# Patient Record
Sex: FEMALE | Race: WHITE
[De-identification: names, ages, dates, MRNs, and addresses within clinical notes are randomized per-mention and may not be internally consistent; named-entity substitution may affect disease eponyms.]

---

## 2018-12-19 ENCOUNTER — HOSPITAL ENCOUNTER (OUTPATIENT)
Dept: HOSPITAL 95 - LAB SHORT | Age: 63
Discharge: HOME | End: 2018-12-19
Attending: PHYSICIAN ASSISTANT
Payer: SELF-PAY

## 2018-12-19 DIAGNOSIS — R53.83: Primary | ICD-10-CM

## 2018-12-19 LAB
ANION GAP SERPL CALCULATED.4IONS-SCNC: 7 MMOL/L (ref 6–16)
BASOPHILS # BLD AUTO: 0.04 K/MM3 (ref 0–0.23)
BASOPHILS NFR BLD AUTO: 1 % (ref 0–2)
BUN SERPL-MCNC: 9 MG/DL (ref 8–24)
CALCIUM SERPL-MCNC: 8.6 MG/DL (ref 8.5–10.1)
CHLORIDE SERPL-SCNC: 101 MMOL/L (ref 98–108)
CO2 SERPL-SCNC: 31 MMOL/L (ref 21–32)
CREAT SERPL-MCNC: 0.87 MG/DL (ref 0.4–1)
DEPRECATED RDW RBC AUTO: 47 FL (ref 35.1–46.3)
EOSINOPHIL # BLD AUTO: 0.21 K/MM3 (ref 0–0.68)
EOSINOPHIL NFR BLD AUTO: 3 % (ref 0–6)
ERYTHROCYTE [DISTWIDTH] IN BLOOD BY AUTOMATED COUNT: 13.5 % (ref 11.7–14.2)
GLUCOSE SERPL-MCNC: 145 MG/DL (ref 70–99)
HCT VFR BLD AUTO: 41.2 % (ref 33–51)
HGB BLD-MCNC: 13.9 G/DL (ref 11.5–16)
IMM GRANULOCYTES # BLD AUTO: 0.02 K/MM3 (ref 0–0.1)
IMM GRANULOCYTES NFR BLD AUTO: 0 % (ref 0–1)
LYMPHOCYTES # BLD AUTO: 2.71 K/MM3 (ref 0.84–5.2)
LYMPHOCYTES NFR BLD AUTO: 43 % (ref 21–46)
MCHC RBC AUTO-ENTMCNC: 33.7 G/DL (ref 31.5–36.5)
MCV RBC AUTO: 94 FL (ref 80–100)
MONOCYTES # BLD AUTO: 0.51 K/MM3 (ref 0.16–1.47)
MONOCYTES NFR BLD AUTO: 8 % (ref 4–13)
NEUTROPHILS # BLD AUTO: 2.83 K/MM3 (ref 1.96–9.15)
NEUTROPHILS NFR BLD AUTO: 45 % (ref 41–73)
NRBC # BLD AUTO: 0 K/MM3 (ref 0–0.02)
NRBC BLD AUTO-RTO: 0 /100 WBC (ref 0–0.2)
PLATELET # BLD AUTO: 61 K/MM3 (ref 150–400)
POTASSIUM SERPL-SCNC: 3.3 MMOL/L (ref 3.5–5.5)
SODIUM SERPL-SCNC: 139 MMOL/L (ref 136–145)
TROPONIN I SERPL-MCNC: <0.015 NG/ML (ref 0–0.04)

## 2019-01-25 ENCOUNTER — HOSPITAL ENCOUNTER (INPATIENT)
Dept: HOSPITAL 95 - ER | Age: 64
LOS: 3 days | Discharge: ANOTHER HOSPITAL | DRG: 871 | End: 2019-01-28
Attending: HOSPITALIST | Admitting: HOSPITALIST
Payer: SELF-PAY

## 2019-01-25 VITALS — HEIGHT: 69.02 IN | BODY MASS INDEX: 41.14 KG/M2 | WEIGHT: 277.78 LBS

## 2019-01-25 DIAGNOSIS — R63.4: ICD-10-CM

## 2019-01-25 DIAGNOSIS — D69.6: ICD-10-CM

## 2019-01-25 DIAGNOSIS — L03.115: ICD-10-CM

## 2019-01-25 DIAGNOSIS — Z85.828: ICD-10-CM

## 2019-01-25 DIAGNOSIS — K74.60: ICD-10-CM

## 2019-01-25 DIAGNOSIS — I33.0: ICD-10-CM

## 2019-01-25 DIAGNOSIS — Z88.0: ICD-10-CM

## 2019-01-25 DIAGNOSIS — Z88.6: ICD-10-CM

## 2019-01-25 DIAGNOSIS — E87.2: ICD-10-CM

## 2019-01-25 DIAGNOSIS — A41.1: Primary | ICD-10-CM

## 2019-01-25 DIAGNOSIS — K25.4: ICD-10-CM

## 2019-01-25 DIAGNOSIS — R01.1: ICD-10-CM

## 2019-01-25 DIAGNOSIS — D50.0: ICD-10-CM

## 2019-01-25 DIAGNOSIS — B18.2: ICD-10-CM

## 2019-01-25 DIAGNOSIS — K21.9: ICD-10-CM

## 2019-01-25 DIAGNOSIS — B95.8: ICD-10-CM

## 2019-01-25 DIAGNOSIS — R06.00: ICD-10-CM

## 2019-01-25 DIAGNOSIS — R55: ICD-10-CM

## 2019-01-25 DIAGNOSIS — I87.321: ICD-10-CM

## 2019-01-25 DIAGNOSIS — I35.1: ICD-10-CM

## 2019-01-25 DIAGNOSIS — Z88.2: ICD-10-CM

## 2019-01-25 DIAGNOSIS — E66.9: ICD-10-CM

## 2019-01-25 DIAGNOSIS — F17.210: ICD-10-CM

## 2019-01-25 DIAGNOSIS — D72.829: ICD-10-CM

## 2019-01-25 LAB
ALBUMIN SERPL BCP-MCNC: 1.9 G/DL (ref 3.4–5)
ALBUMIN/GLOB SERPL: 0.6 {RATIO} (ref 0.8–1.8)
ALT SERPL W P-5'-P-CCNC: 20 U/L (ref 12–78)
ANION GAP SERPL CALCULATED.4IONS-SCNC: 7 MMOL/L (ref 6–16)
AST SERPL W P-5'-P-CCNC: 39 U/L (ref 12–37)
BASOPHILS # BLD AUTO: 0.05 K/MM3 (ref 0–0.23)
BASOPHILS NFR BLD AUTO: 0 % (ref 0–2)
BILIRUB SERPL-MCNC: 1.6 MG/DL (ref 0.1–1)
BUN SERPL-MCNC: 9 MG/DL (ref 8–24)
CALCIUM SERPL-MCNC: 7.2 MG/DL (ref 8.5–10.1)
CHLORIDE SERPL-SCNC: 107 MMOL/L (ref 98–108)
CO2 SERPL-SCNC: 29 MMOL/L (ref 21–32)
CREAT SERPL-MCNC: 0.68 MG/DL (ref 0.4–1)
DEPRECATED RDW RBC AUTO: 53.4 FL (ref 35.1–46.3)
EOSINOPHIL # BLD AUTO: 0.16 K/MM3 (ref 0–0.68)
EOSINOPHIL NFR BLD AUTO: 1 % (ref 0–6)
ERYTHROCYTE [DISTWIDTH] IN BLOOD BY AUTOMATED COUNT: 14.3 % (ref 11.7–14.2)
GLOBULIN SER CALC-MCNC: 3.3 G/DL (ref 2.2–4)
GLUCOSE SERPL-MCNC: 162 MG/DL (ref 70–99)
HCT VFR BLD AUTO: 31.4 % (ref 33–51)
HGB BLD-MCNC: 9.8 G/DL (ref 11.5–16)
IMM GRANULOCYTES # BLD AUTO: 0.18 K/MM3 (ref 0–0.1)
IMM GRANULOCYTES NFR BLD AUTO: 2 % (ref 0–1)
LYMPHOCYTES # BLD AUTO: 2.66 K/MM3 (ref 0.84–5.2)
LYMPHOCYTES NFR BLD AUTO: 22 % (ref 21–46)
MCHC RBC AUTO-ENTMCNC: 31.2 G/DL (ref 31.5–36.5)
MCV RBC AUTO: 102 FL (ref 80–100)
MONOCYTES # BLD AUTO: 0.95 K/MM3 (ref 0.16–1.47)
MONOCYTES NFR BLD AUTO: 8 % (ref 4–13)
NEUTROPHILS # BLD AUTO: 8.09 K/MM3 (ref 1.96–9.15)
NEUTROPHILS NFR BLD AUTO: 67 % (ref 41–73)
NRBC # BLD AUTO: 0 K/MM3 (ref 0–0.02)
NRBC BLD AUTO-RTO: 0 /100 WBC (ref 0–0.2)
PLATELET # BLD AUTO: 81 K/MM3 (ref 150–400)
POTASSIUM SERPL-SCNC: 4.6 MMOL/L (ref 3.5–5.5)
PROT SERPL-MCNC: 5.2 G/DL (ref 6.4–8.2)
PROTHROMBIN TIME: 14.6 SEC (ref 9.7–11.5)
SODIUM SERPL-SCNC: 143 MMOL/L (ref 136–145)

## 2019-01-25 PROCEDURE — C9113 INJ PANTOPRAZOLE SODIUM, VIA: HCPCS

## 2019-01-25 PROCEDURE — C1751 CATH, INF, PER/CENT/MIDLINE: HCPCS

## 2019-01-25 PROCEDURE — P9016 RBC LEUKOCYTES REDUCED: HCPCS

## 2019-01-26 LAB
BASOPHILS # BLD AUTO: 0.03 K/MM3 (ref 0–0.23)
BASOPHILS NFR BLD AUTO: 0 % (ref 0–2)
BILIRUB UR QL STRIP: (no result)
DEPRECATED RDW RBC AUTO: 53.4 FL (ref 35.1–46.3)
EOSINOPHIL # BLD AUTO: 0.03 K/MM3 (ref 0–0.68)
EOSINOPHIL NFR BLD AUTO: 0 % (ref 0–6)
ERYTHROCYTE [DISTWIDTH] IN BLOOD BY AUTOMATED COUNT: 14.4 % (ref 11.7–14.2)
HCT VFR BLD AUTO: 24.2 % (ref 33–51)
HCT VFR BLD AUTO: 26.6 % (ref 33–51)
HCT VFR BLD AUTO: 27.5 % (ref 33–51)
HCT VFR BLD AUTO: 29.8 % (ref 33–51)
HGB BLD-MCNC: 7.6 G/DL (ref 11.5–16)
HGB BLD-MCNC: 8.6 G/DL (ref 11.5–16)
HGB BLD-MCNC: 8.7 G/DL (ref 11.5–16)
HGB BLD-MCNC: 9.2 G/DL (ref 11.5–16)
IMM GRANULOCYTES # BLD AUTO: 0.14 K/MM3 (ref 0–0.1)
IMM GRANULOCYTES NFR BLD AUTO: 1 % (ref 0–1)
KETONES UR STRIP-MCNC: (no result) MG/DL
LEUKOCYTE ESTERASE UR QL STRIP: (no result)
LYMPHOCYTES # BLD AUTO: 1.64 K/MM3 (ref 0.84–5.2)
LYMPHOCYTES NFR BLD AUTO: 17 % (ref 21–46)
MCHC RBC AUTO-ENTMCNC: 31.3 G/DL (ref 31.5–36.5)
MCV RBC AUTO: 102 FL (ref 80–100)
MONOCYTES # BLD AUTO: 0.65 K/MM3 (ref 0.16–1.47)
MONOCYTES NFR BLD AUTO: 7 % (ref 4–13)
NEUTROPHILS # BLD AUTO: 7.22 K/MM3 (ref 1.96–9.15)
NEUTROPHILS NFR BLD AUTO: 74 % (ref 41–73)
NRBC # BLD AUTO: 0 K/MM3 (ref 0–0.02)
NRBC BLD AUTO-RTO: 0 /100 WBC (ref 0–0.2)
PLATELET # BLD AUTO: 57 K/MM3 (ref 150–400)
PROT UR STRIP-MCNC: (no result) MG/DL
RBC #/AREA URNS HPF: (no result) /HPF (ref 0–2)
SP GR SPEC: 1.02 (ref 1–1.02)
UROBILINOGEN UR STRIP-MCNC: (no result) MG/DL
WBC #/AREA URNS HPF: (no result) /HPF (ref 0–5)

## 2019-01-26 NOTE — NUR
CALLED DR MATHUR. TEMP 103.2. ICED. NOW DOWN .7. /77 P 106.
ORDERS OKAY PO TYLENOL. AND GIVE 500 BOLUS N/C

## 2019-01-26 NOTE — NUR
walking rounds with day shift nurse, call light in reach, a+o, able to make
needs known, room air, saline locked, able to transfer to bathroom with
standby assist, two mostly bloody stools, states she is feeling weak, checked
vitials

## 2019-01-26 NOTE — NUR
ASSUMED CARE OF PT-
BEDSIDE REPORT COMPLETE WITH VIRGIE MENDOZA. PER RPOET PT IS 1 ASSIST TO THE
BATHROOM. PT HAS A GI BLEED. GI CONSULT CALLED PER REPORT FROM NIGHT RN. PT
ALERT AND ORIENTED. SPOKE TO PT AFTER REPORT AND SHE REPORTS FREQUENT FALLS AT
HOME STATED HER LEFT ELBOW HAS BEEN PAINFUL SINCE SHE HAD A FALL AT HOME. PT
HAS TWO AC IVS BOTH PATENT ONE SL AND ONE RUNNING NS CURRENTLY.

## 2019-01-26 NOTE — NUR
PT PLEASANT SINCE TRANSFER TO FLOOR. BOLUS ORDERED AND BEING GIVEN. TEMP UP TO
103.2. TYLENOL GIVEN AND ICE PACKS, NOW .1 AT 1845.  WAS NOTIFIED.  PT
WAS RETURNED TO 3RD FLOOR FROM ATTEMPTED GI. IN DISTRESS. MOVED HERE THIS AFT.
DENIES PAIN AT THIS TIME. BED IN LOW APOSITION, CALL LITE IN REACH, CALLS
APPROP

## 2019-01-26 NOTE — NUR
TRANSFER NOTE-
PT TRANSFERED TO PCU AFTER GOING TO DAY SURGERY. DAY SURGERY RN CALLED AND
STATED PT NOT STABLE TO GO THROUGH PROCEDURE AT THIS TIME. DAY SURGERY
RETURNED PT TO MEDICAL FLOOR,  NOTIFIED OF THE ISSUES AND TRANSFER ORDER
RECIEVED. CALLED REPORT TO VIRGIE CORONEL IN PCU. OCTREOTIDE, LEVOFLOXACIN, PROTONIX
AND IVF ALL RUNNING. PT TAKEN DOWN BY RN AND CHARGE RN, FLU SWAB COMPLETE
PRIOR TO TRANSFER. PT ALERT BUT BECOMING FORGETFUL AT THE TIME OF TRANSFER.
LAST VITALS SHOW O2 SATS IN THE HIGH 80'S, PLACED PT ON 2L O2 NC, O2 SATS 96%
30 MINUTES AFTER O2 PLACED. PT STATED THE O2 MADE HER FEEL BETTER. CHEST XRAY
DONE PRIOR TO TRANSFER. PT SO PRESENT FOR TRANSFER AND IS AWARE.

## 2019-01-26 NOTE — NUR
GI DOCTOR IN TO SEE THE PT. STARTING PROTONIX AND SANDOSTATIN DRIPS. PT
ALREADY HAS 2 IV'S. PT TO REMAIN NPO AT THIS TIME.

## 2019-01-26 NOTE — NUR
unsure where blood was coming from, placed two hats in comode, front was thomas
urine, back dark red/black 200cc

## 2019-01-26 NOTE — NUR
SPOKE TO GI DOCTOR IN THE HALLWAY AS PT WAS TRANSFERING OUT NO PLANS TO DO
PROCEDURE AT THIS TIME NEW ORDER FOR CLEAR LIQUID DIET PLACED.

## 2019-01-27 LAB
ALBUMIN SERPL BCP-MCNC: 1.9 G/DL (ref 3.4–5)
ALBUMIN/GLOB SERPL: 0.6 {RATIO} (ref 0.8–1.8)
ALT SERPL W P-5'-P-CCNC: 18 U/L (ref 12–78)
ANION GAP SERPL CALCULATED.4IONS-SCNC: 4 MMOL/L (ref 6–16)
AST SERPL W P-5'-P-CCNC: 42 U/L (ref 12–37)
BASOPHILS # BLD AUTO: 0.05 K/MM3 (ref 0–0.23)
BASOPHILS NFR BLD AUTO: 0 % (ref 0–2)
BILIRUB SERPL-MCNC: 1.8 MG/DL (ref 0.1–1)
BUN SERPL-MCNC: 19 MG/DL (ref 8–24)
CALCIUM SERPL-MCNC: 6.8 MG/DL (ref 8.5–10.1)
CHLORIDE SERPL-SCNC: 112 MMOL/L (ref 98–108)
CO2 SERPL-SCNC: 26 MMOL/L (ref 21–32)
CREAT SERPL-MCNC: 0.83 MG/DL (ref 0.4–1)
DEPRECATED RDW RBC AUTO: 56.4 FL (ref 35.1–46.3)
EOSINOPHIL # BLD AUTO: 0.02 K/MM3 (ref 0–0.68)
EOSINOPHIL NFR BLD AUTO: 0 % (ref 0–6)
ERYTHROCYTE [DISTWIDTH] IN BLOOD BY AUTOMATED COUNT: 14.6 % (ref 11.7–14.2)
GLOBULIN SER CALC-MCNC: 3.1 G/DL (ref 2.2–4)
GLUCOSE SERPL-MCNC: 152 MG/DL (ref 70–99)
HCT VFR BLD AUTO: 24 % (ref 33–51)
HCT VFR BLD AUTO: 25.5 % (ref 33–51)
HCT VFR BLD AUTO: 26 % (ref 33–51)
HGB BLD-MCNC: 7.1 G/DL (ref 11.5–16)
HGB BLD-MCNC: 7.9 G/DL (ref 11.5–16)
HGB BLD-MCNC: 8 G/DL (ref 11.5–16)
IMM GRANULOCYTES # BLD AUTO: 0.2 K/MM3 (ref 0–0.1)
IMM GRANULOCYTES NFR BLD AUTO: 1 % (ref 0–1)
LYMPHOCYTES # BLD AUTO: 2.23 K/MM3 (ref 0.84–5.2)
LYMPHOCYTES NFR BLD AUTO: 8 % (ref 21–46)
MAGNESIUM SERPL-MCNC: 1.9 MG/DL (ref 1.6–2.4)
MCHC RBC AUTO-ENTMCNC: 30.8 G/DL (ref 31.5–36.5)
MCV RBC AUTO: 105 FL (ref 80–100)
MONOCYTES # BLD AUTO: 1.08 K/MM3 (ref 0.16–1.47)
MONOCYTES NFR BLD AUTO: 4 % (ref 4–13)
NEUTROPHILS # BLD AUTO: 23.46 K/MM3 (ref 1.96–9.15)
NEUTROPHILS NFR BLD AUTO: 87 % (ref 41–73)
NRBC # BLD AUTO: 0.02 K/MM3 (ref 0–0.02)
NRBC BLD AUTO-RTO: 0.1 /100 WBC (ref 0–0.2)
PLATELET # BLD AUTO: 54 K/MM3 (ref 150–400)
POTASSIUM SERPL-SCNC: 4.8 MMOL/L (ref 3.5–5.5)
PROT SERPL-MCNC: 5 G/DL (ref 6.4–8.2)
SODIUM SERPL-SCNC: 142 MMOL/L (ref 136–145)

## 2019-01-27 PROCEDURE — 30233N1 TRANSFUSION OF NONAUTOLOGOUS RED BLOOD CELLS INTO PERIPHERAL VEIN, PERCUTANEOUS APPROACH: ICD-10-PCS | Performed by: HOSPITALIST

## 2019-01-27 NOTE — NUR
PM NOTE.
 
ASSUMED CARE OF PT APROX 1900. PT IS A&Ox4 PLEASENT AND COOPERATIVE WITH CARE.
PT WAS ADMITTED DUE TO GI BLEED AND SEPSIS. PT HAS POSITIVE BLOOD CULTURES
(SEE LABS). PT IS ORDERED TO GET 2 UNITS OF PRBCS, FIRST UNIT IS CURRENTLY
TRANSFUSING.
 
TELE INTACT, NSR IN THE 80'S PER MONITOR TECH. 3-4+ PITTING EDEMA NOTED TO THE
PT'T BLE, PT HAS GENERALIZED EDEMA TO HER BUE. PT'S /70. L/S CLEAR T/O.
BT PRESENT AND HYPERACTIVE, ABD IS SOFT AND NONTENDER TO PALP.
 
PT IS CURRENTLY FEBRILE .0, PT WAS GIVEN TYLENOL PER EMAR WILL CONTINUE
TO MONITOR.
 
CALL LIGHT IN REACH, BED IS LOCKED AND LOW WILL CONTINUE TO MONITOR.

## 2019-01-27 NOTE — NUR
SHIFT SUMMARY
PT ALERT AND ORIENTED. VS STABLE AT THIS TIME. BP IMPROVED SEE VS. O2 SATS
>92% ON 1L VIA NC. PT HAD TWO COFFEE GROUND BM THIS SHIFT. PT NO LONGER
FEBRILE. NS, OCTREOTIDE, AND PROTONIX INF PER ORDERS. MORNING LABS TRENDING IN
THE EXPECTED DIRECTION. NO OTHER CHANGES SINCE INITIAL ASSESSMENT. PT RESTING
AT THIS TIME WITH OBVIOUS CHEST RISE AND FALL. CALL LIGHT IN REACH. WILL
CONTINUE TO MONITOR AND REPORT TO ONCOMING RN.

## 2019-01-27 NOTE — NUR
UPDATE
DR WEBB CALLED DUE TO CHANGES IN LAB VALUES AND MINIMAL URINE OUTPUT. NEW
ORDERS PROVIDED. PT ALERT AND ORIENTED. BP STABLE AT THIS TIME. 02 SATS >92%
ON 2L NC. WILL CONTINUE TO MONITOR.

## 2019-01-27 NOTE — NUR
SUMMARY
PT'S HGB 7.1 THIS SHIFT.  ORDERS OBTAINED TO TRANSFUSE 1 UNIT PRBCS.  PT
PASSED SMALL AMT STOOL THIS SHIFT W/ SCANT AMT APPEARANCE BLOOD. NEW ORDERS
FOR CARDIOLOGY AND INFECTIOUS DISEASE CONSULTS.  PT PLEASANT AND COOPERATIVE.
VSS.  CALL LIGHT IN REACH.

## 2019-01-28 LAB
ALBUMIN SERPL BCP-MCNC: 2.1 G/DL (ref 3.4–5)
ALBUMIN/GLOB SERPL: 0.6 {RATIO} (ref 0.8–1.8)
ALT SERPL W P-5'-P-CCNC: 22 U/L (ref 12–78)
ANION GAP SERPL CALCULATED.4IONS-SCNC: 4 MMOL/L (ref 6–16)
AST SERPL W P-5'-P-CCNC: 48 U/L (ref 12–37)
BASOPHILS # BLD AUTO: 0.08 K/MM3 (ref 0–0.23)
BASOPHILS NFR BLD AUTO: 0 % (ref 0–2)
BILIRUB SERPL-MCNC: 2.1 MG/DL (ref 0.1–1)
BUN SERPL-MCNC: 15 MG/DL (ref 8–24)
CALCIUM SERPL-MCNC: 6.9 MG/DL (ref 8.5–10.1)
CHLORIDE SERPL-SCNC: 111 MMOL/L (ref 98–108)
CO2 SERPL-SCNC: 27 MMOL/L (ref 21–32)
CREAT SERPL-MCNC: 0.8 MG/DL (ref 0.4–1)
DEPRECATED RDW RBC AUTO: 59.8 FL (ref 35.1–46.3)
EOSINOPHIL # BLD AUTO: 0.3 K/MM3 (ref 0–0.68)
EOSINOPHIL NFR BLD AUTO: 2 % (ref 0–6)
ERYTHROCYTE [DISTWIDTH] IN BLOOD BY AUTOMATED COUNT: 16.2 % (ref 11.7–14.2)
GLOBULIN SER CALC-MCNC: 3.4 G/DL (ref 2.2–4)
GLUCOSE SERPL-MCNC: 107 MG/DL (ref 70–99)
HCT VFR BLD AUTO: 27.8 % (ref 33–51)
HGB BLD-MCNC: 8.8 G/DL (ref 11.5–16)
IMM GRANULOCYTES # BLD AUTO: 0.3 K/MM3 (ref 0–0.1)
IMM GRANULOCYTES NFR BLD AUTO: 2 % (ref 0–1)
LYMPHOCYTES # BLD AUTO: 2.93 K/MM3 (ref 0.84–5.2)
LYMPHOCYTES NFR BLD AUTO: 16 % (ref 21–46)
MCHC RBC AUTO-ENTMCNC: 31.7 G/DL (ref 31.5–36.5)
MCV RBC AUTO: 102 FL (ref 80–100)
MONOCYTES # BLD AUTO: 1.32 K/MM3 (ref 0.16–1.47)
MONOCYTES NFR BLD AUTO: 7 % (ref 4–13)
NEUTROPHILS # BLD AUTO: 12.97 K/MM3 (ref 1.96–9.15)
NEUTROPHILS NFR BLD AUTO: 72 % (ref 41–73)
NRBC # BLD AUTO: 0.06 K/MM3 (ref 0–0.02)
NRBC BLD AUTO-RTO: 0.3 /100 WBC (ref 0–0.2)
PLATELET # BLD AUTO: 54 K/MM3 (ref 150–400)
POTASSIUM SERPL-SCNC: 4.3 MMOL/L (ref 3.5–5.5)
PROT SERPL-MCNC: 5.5 G/DL (ref 6.4–8.2)
PROTHROMBIN TIME: 15 SEC (ref 9.7–11.5)
SODIUM SERPL-SCNC: 142 MMOL/L (ref 136–145)

## 2019-01-28 PROCEDURE — 0DB68ZX EXCISION OF STOMACH, VIA NATURAL OR ARTIFICIAL OPENING ENDOSCOPIC, DIAGNOSTIC: ICD-10-PCS | Performed by: STUDENT IN AN ORGANIZED HEALTH CARE EDUCATION/TRAINING PROGRAM

## 2019-01-28 PROCEDURE — 0W3P8ZZ CONTROL BLEEDING IN GASTROINTESTINAL TRACT, VIA NATURAL OR ARTIFICIAL OPENING ENDOSCOPIC: ICD-10-PCS | Performed by: STUDENT IN AN ORGANIZED HEALTH CARE EDUCATION/TRAINING PROGRAM

## 2019-01-28 PROCEDURE — 30233N1 TRANSFUSION OF NONAUTOLOGOUS RED BLOOD CELLS INTO PERIPHERAL VEIN, PERCUTANEOUS APPROACH: ICD-10-PCS | Performed by: HOSPITALIST

## 2019-01-28 PROCEDURE — 3E0G8GC INTRODUCTION OF OTHER THERAPEUTIC SUBSTANCE INTO UPPER GI, VIA NATURAL OR ARTIFICIAL OPENING ENDOSCOPIC: ICD-10-PCS | Performed by: STUDENT IN AN ORGANIZED HEALTH CARE EDUCATION/TRAINING PROGRAM

## 2019-01-28 PROCEDURE — 0DJD8ZZ INSPECTION OF LOWER INTESTINAL TRACT, VIA NATURAL OR ARTIFICIAL OPENING ENDOSCOPIC: ICD-10-PCS | Performed by: STUDENT IN AN ORGANIZED HEALTH CARE EDUCATION/TRAINING PROGRAM

## 2019-01-28 PROCEDURE — 05HY33Z INSERTION OF INFUSION DEVICE INTO UPPER VEIN, PERCUTANEOUS APPROACH: ICD-10-PCS | Performed by: HOSPITALIST

## 2019-01-28 NOTE — NUR
PT TRANSPORTED TO [facility]. AGREES WITH PLANNED PROCEDURE. DR. VIDALES IN TO SEE
PT AND OBTAIN CONSENT. LUNG SOUNDS CLEAR.

## 2019-01-28 NOTE — NUR
PT UPTDATE...
 
2ND UNIT OF BLOOD STARTED PER ORDERS, PT HAS HAD MULTIPLE IV'S INFLITRATE AND
A NEW SITE WAS NEEDED, PT HAS VERY FRAGILE AND DEEP VEINS AND REQUIRED A
SONOSITE TO BE USED TO OBTAIN IV ACCESS.
 
CALL LIGHT IN REACH, BED IS LOCKED AND LOW WILL CONTINUE TO MONITOR

## 2019-01-28 NOTE — NUR
01/28/19 1341 Sumerlin,David C
PATIENT DETERMINED TO BE ASA APPROPRIATE FOR PROPOFOL SEDATION PRIOR
TO START OF PROCEDURE BY . 3-LEAD EKG REVIEWED WITH PHYSICIAN PRIOR
TO START OF PROCEDURE.PATIENT CONFIRMS NPO STATUS AND AGREES WITH
SCHEDULED PROCEDURE.History, Chart, Medications and Allergies reviewed
before start of procedure.MONITOR INTACT WITH CONTINUOUS PULSE
OXIMETRY AND INTERMITTENT BP.O2 VIA N/C INTACT THROUGHOUT
SEDATION/PROCEDURE.Bite Block Placed

## 2019-01-28 NOTE — NUR
SHIFT SUMMARY.
 
PT'S IV SITE FOR THE PRBCS INFILTRATED, IV WAS D/C'D WNL. PT HAS 2 OTHER IV
SITES THAT ARE RUNNING CONINUOUS IV PROTONIX AND OCTEOTRIED.  PROVIDER WAS
CALLED AFTER PT'S H&H LABS WERE OBTAINED, PROVIDER D/C'D 2ND UNIT OF BLOOD FOR
NOW. ORDERS FOR PICC LINE OBTAINED FOR THIS AM. PT HAS BEEN NPO SINCE MIDNIGHT
FOR EGD AND POSSIBLE LYNDA TODAY.
 
CALL LIGHT IN REACH, BED IS LOCKED AND LOW WILL CONTINUE TO MONITOR UNTIL
REPORT IS GIVEN TO ONCOMING RN.

## 2019-01-29 LAB
HCV LOG10: 5.98
HCV RNA SERPL NAA+PROBE-ACNC: (no result) IU/ML

## 2019-02-01 LAB — HCV GENTYP SERPL NAA+PROBE: 3

## 2019-06-23 ENCOUNTER — HOSPITAL ENCOUNTER (INPATIENT)
Dept: HOSPITAL 95 - ER | Age: 64
Discharge: ANOTHER HOSPITAL | DRG: 871 | End: 2019-06-23
Attending: INTERNAL MEDICINE | Admitting: INTERNAL MEDICINE
Payer: SELF-PAY

## 2019-06-23 VITALS — WEIGHT: 273.37 LBS | HEIGHT: 66 IN | BODY MASS INDEX: 43.93 KG/M2

## 2019-06-23 DIAGNOSIS — A41.9: Primary | ICD-10-CM

## 2019-06-23 DIAGNOSIS — I33.0: ICD-10-CM

## 2019-06-23 DIAGNOSIS — D69.6: ICD-10-CM

## 2019-06-23 DIAGNOSIS — R65.21: ICD-10-CM

## 2019-06-23 DIAGNOSIS — E66.9: ICD-10-CM

## 2019-06-23 DIAGNOSIS — K70.31: ICD-10-CM

## 2019-06-23 DIAGNOSIS — I35.1: ICD-10-CM

## 2019-06-23 DIAGNOSIS — K27.4: ICD-10-CM

## 2019-06-23 DIAGNOSIS — B19.20: ICD-10-CM

## 2019-06-23 DIAGNOSIS — F17.210: ICD-10-CM

## 2019-06-23 LAB
ALBUMIN SERPL BCP-MCNC: 2 G/DL (ref 3.4–5)
ALBUMIN/GLOB SERPL: 0.6 {RATIO} (ref 0.8–1.8)
ALT SERPL W P-5'-P-CCNC: 18 U/L (ref 12–78)
ANION GAP SERPL CALCULATED.4IONS-SCNC: 10 MMOL/L (ref 6–16)
AST SERPL W P-5'-P-CCNC: 44 U/L (ref 12–37)
BASOPHILS # BLD AUTO: 0.04 K/MM3 (ref 0–0.23)
BASOPHILS NFR BLD AUTO: 0 % (ref 0–2)
BILIRUB SERPL-MCNC: 3.9 MG/DL (ref 0.1–1)
BILIRUB UR QL STRIP: (no result)
BILIRUB UR QL STRIP: (no result)
BUN SERPL-MCNC: 29 MG/DL (ref 8–24)
CALCIUM SERPL-MCNC: 8 MG/DL (ref 8.5–10.1)
CHLORIDE SERPL-SCNC: 107 MMOL/L (ref 98–108)
CO2 SERPL-SCNC: 22 MMOL/L (ref 21–32)
CREAT SERPL-MCNC: 0.87 MG/DL (ref 0.4–1)
DEPRECATED RDW RBC AUTO: 65.4 FL (ref 35.1–46.3)
EOSINOPHIL # BLD AUTO: 0.01 K/MM3 (ref 0–0.68)
EOSINOPHIL NFR BLD AUTO: 0 % (ref 0–6)
ERYTHROCYTE [DISTWIDTH] IN BLOOD BY AUTOMATED COUNT: 19.3 % (ref 11.7–14.2)
GLOBULIN SER CALC-MCNC: 3.5 G/DL (ref 2.2–4)
GLUCOSE SERPL-MCNC: 82 MG/DL (ref 70–99)
HCT VFR BLD AUTO: 24.1 % (ref 33–51)
HCT VFR BLD AUTO: 25 % (ref 33–51)
HCT VFR BLD AUTO: 28.1 % (ref 33–51)
HGB BLD-MCNC: 7.7 G/DL (ref 11.5–16)
HGB BLD-MCNC: 8.1 G/DL (ref 11.5–16)
HGB BLD-MCNC: 9.2 G/DL (ref 11.5–16)
IMM GRANULOCYTES # BLD AUTO: 0.24 K/MM3 (ref 0–0.1)
IMM GRANULOCYTES NFR BLD AUTO: 2 % (ref 0–1)
KETONES UR STRIP-MCNC: (no result) MG/DL
KETONES UR STRIP-MCNC: (no result) MG/DL
LEUKOCYTE ESTERASE UR QL STRIP: (no result)
LEUKOCYTE ESTERASE UR QL STRIP: (no result)
LYMPHOCYTES # BLD AUTO: 0.9 K/MM3 (ref 0.84–5.2)
LYMPHOCYTES NFR BLD AUTO: 6 % (ref 21–46)
MCHC RBC AUTO-ENTMCNC: 32.7 G/DL (ref 31.5–36.5)
MCV RBC AUTO: 94 FL (ref 80–100)
MONOCYTES # BLD AUTO: 0.44 K/MM3 (ref 0.16–1.47)
MONOCYTES NFR BLD AUTO: 3 % (ref 4–13)
NEUTROPHILS # BLD AUTO: 12.65 K/MM3 (ref 1.96–9.15)
NEUTROPHILS NFR BLD AUTO: 89 % (ref 41–73)
NRBC # BLD AUTO: 0.06 K/MM3 (ref 0–0.02)
NRBC BLD AUTO-RTO: 0.4 /100 WBC (ref 0–0.2)
PLATELET # BLD AUTO: 29 K/MM3 (ref 150–400)
POTASSIUM SERPL-SCNC: 3.9 MMOL/L (ref 3.5–5.5)
PROT SERPL-MCNC: 5.5 G/DL (ref 6.4–8.2)
PROT UR STRIP-MCNC: (no result) MG/DL
PROT UR STRIP-MCNC: (no result) MG/DL
PROTHROMBIN TIME: 17.2 SEC (ref 9.7–11.5)
RBC #/AREA URNS HPF: (no result) /HPF (ref 0–2)
RBC #/AREA URNS HPF: (no result) /HPF (ref 0–2)
SODIUM SERPL-SCNC: 139 MMOL/L (ref 136–145)
SP GR SPEC: 1.01 (ref 1–1.02)
SP GR SPEC: 1.01 (ref 1–1.02)
UROBILINOGEN UR STRIP-MCNC: (no result) MG/DL
UROBILINOGEN UR STRIP-MCNC: (no result) MG/DL
WBC #/AREA URNS HPF: (no result) /HPF (ref 0–5)
WBC #/AREA URNS HPF: (no result) /HPF (ref 0–5)

## 2019-06-23 PROCEDURE — A9270 NON-COVERED ITEM OR SERVICE: HCPCS

## 2019-06-23 PROCEDURE — P9612 CATHETERIZE FOR URINE SPEC: HCPCS

## 2019-06-23 PROCEDURE — C9113 INJ PANTOPRAZOLE SODIUM, VIA: HCPCS

## 2019-06-23 NOTE — NUR
REASSESSMENT
 
PT REMAINS SITTING IN BED AND IN NAD AT THIS TIME. VSS. NSR, HR 70S. MAP
REMAINS LOW WITH MAP 60-65; MD AWARE. NS MIV INFUSING  ML/HR PER ORDER.
PROTONIX GTT INFUSING AT 10 ML/HR PER ORDER. ABDOMINAL AND BLE ULTRASOUND
COMPLETED. PT ACCEPTED BY [facility] FOR TRANSFER, AWAITING BED ASSIGNMENT AT
THIS TIME. REMAINS A/O X 3. WILL CONTINUE TO MONITOR.

## 2019-06-23 NOTE — NUR
ARRIVAL TO ICU
 
1140 - PT ARRIVES TO ICU AT THIS TIME FROM ED. SHE IS A/O X 3, CALM AND
COOPERATIVE. REQUESTING WATER AND FOOD; BUT INFORMED PT THAT SHE CANNOT HAVE
ANYTHING TO DRINK AT THIS TIME. MD TATE BEDSIDE SHORTLY AFTER ARRIVAL TO
ICU AND ASKED THAT 2L NC BE APPLIED AS PT BECOMES OCCASSIONALLY SHORT
OF BREATH WHEN TALKING; SPO2 97% ON RA. LUNG SOUNDS CLEAR. 3RD NS BOLUS
FINISHED INFUSING UPON ARRIVAL. PROTONIX GTT REMAINS INFUSING. PT PALE IN
COLOR. NO SIGNS OF ACTIVE BLEEDING. KAUR CATH INSERTED AND UA SENT TO LAB. MD COLE CONSULTED. SBP 80-90S, WITH MAP 60-65. FAMILY BEDSIDE. XRAY OBTAINED OF
R WRIST. WILL CONTINUE TO MONITOR.